# Patient Record
Sex: FEMALE | Race: WHITE | NOT HISPANIC OR LATINO | Employment: PART TIME | URBAN - METROPOLITAN AREA
[De-identification: names, ages, dates, MRNs, and addresses within clinical notes are randomized per-mention and may not be internally consistent; named-entity substitution may affect disease eponyms.]

---
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
## 2025-06-14 SDOH — HEALTH STABILITY: PHYSICAL HEALTH: ON AVERAGE, HOW MANY MINUTES DO YOU ENGAGE IN EXERCISE AT THIS LEVEL?: 20 MIN

## 2025-06-14 SDOH — HEALTH STABILITY: PHYSICAL HEALTH: ON AVERAGE, HOW MANY DAYS PER WEEK DO YOU ENGAGE IN MODERATE TO STRENUOUS EXERCISE (LIKE A BRISK WALK)?: 5 DAYS

## 2025-06-14 ASSESSMENT — SOCIAL DETERMINANTS OF HEALTH (SDOH): HOW OFTEN DO YOU GET TOGETHER WITH FRIENDS OR RELATIVES?: THREE TIMES A WEEK

## 2025-06-18 ASSESSMENT — PATIENT HEALTH QUESTIONNAIRE - PHQ9
SUM OF ALL RESPONSES TO PHQ QUESTIONS 1-9: 18
SUM OF ALL RESPONSES TO PHQ QUESTIONS 1-9: 18
10. IF YOU CHECKED OFF ANY PROBLEMS, HOW DIFFICULT HAVE THESE PROBLEMS MADE IT FOR YOU TO DO YOUR WORK, TAKE CARE OF THINGS AT HOME, OR GET ALONG WITH OTHER PEOPLE: SOMEWHAT DIFFICULT

## 2025-06-19 ENCOUNTER — RESULTS FOLLOW-UP (OUTPATIENT)
Dept: FAMILY MEDICINE | Facility: CLINIC | Age: 20
End: 2025-06-19

## 2025-06-19 ENCOUNTER — OFFICE VISIT (OUTPATIENT)
Dept: FAMILY MEDICINE | Facility: CLINIC | Age: 20
End: 2025-06-19
Payer: COMMERCIAL

## 2025-06-19 VITALS
HEART RATE: 75 BPM | TEMPERATURE: 97.9 F | WEIGHT: 102.44 LBS | BODY MASS INDEX: 18.15 KG/M2 | HEIGHT: 63 IN | OXYGEN SATURATION: 99 % | RESPIRATION RATE: 20 BRPM | DIASTOLIC BLOOD PRESSURE: 67 MMHG | SYSTOLIC BLOOD PRESSURE: 110 MMHG

## 2025-06-19 DIAGNOSIS — F32.1 MAJOR DEPRESSIVE DISORDER, SINGLE EPISODE, MODERATE (H): ICD-10-CM

## 2025-06-19 DIAGNOSIS — F41.1 GAD (GENERALIZED ANXIETY DISORDER): ICD-10-CM

## 2025-06-19 DIAGNOSIS — F32.81 PMDD (PREMENSTRUAL DYSPHORIC DISORDER): ICD-10-CM

## 2025-06-19 DIAGNOSIS — N92.0 MENORRHAGIA WITH REGULAR CYCLE: Primary | ICD-10-CM

## 2025-06-19 LAB
ERYTHROCYTE [DISTWIDTH] IN BLOOD BY AUTOMATED COUNT: 12.3 % (ref 10–15)
HCT VFR BLD AUTO: 39.8 % (ref 35–47)
HGB BLD-MCNC: 13.6 G/DL (ref 11.7–15.7)
MCH RBC QN AUTO: 28.4 PG (ref 26.5–33)
MCHC RBC AUTO-ENTMCNC: 34.2 G/DL (ref 31.5–36.5)
MCV RBC AUTO: 83 FL (ref 78–100)
PLATELET # BLD AUTO: 185 10E3/UL (ref 150–450)
RBC # BLD AUTO: 4.79 10E6/UL (ref 3.8–5.2)
WBC # BLD AUTO: 5.7 10E3/UL (ref 4–11)

## 2025-06-19 PROCEDURE — 83550 IRON BINDING TEST: CPT | Performed by: NURSE PRACTITIONER

## 2025-06-19 PROCEDURE — 3074F SYST BP LT 130 MM HG: CPT | Performed by: NURSE PRACTITIONER

## 2025-06-19 PROCEDURE — 36415 COLL VENOUS BLD VENIPUNCTURE: CPT | Performed by: NURSE PRACTITIONER

## 2025-06-19 PROCEDURE — 82728 ASSAY OF FERRITIN: CPT | Performed by: NURSE PRACTITIONER

## 2025-06-19 PROCEDURE — 85027 COMPLETE CBC AUTOMATED: CPT | Performed by: NURSE PRACTITIONER

## 2025-06-19 PROCEDURE — 99214 OFFICE O/P EST MOD 30 MIN: CPT | Performed by: NURSE PRACTITIONER

## 2025-06-19 PROCEDURE — 3078F DIAST BP <80 MM HG: CPT | Performed by: NURSE PRACTITIONER

## 2025-06-19 PROCEDURE — 1126F AMNT PAIN NOTED NONE PRSNT: CPT | Performed by: NURSE PRACTITIONER

## 2025-06-19 PROCEDURE — 83540 ASSAY OF IRON: CPT | Performed by: NURSE PRACTITIONER

## 2025-06-19 RX ORDER — SERTRALINE HYDROCHLORIDE 25 MG/1
25 TABLET, FILM COATED ORAL DAILY
Qty: 30 TABLET | Refills: 1 | Status: SHIPPED | OUTPATIENT
Start: 2025-06-19

## 2025-06-19 RX ORDER — NAPROXEN 500 MG/1
500 TABLET ORAL 2 TIMES DAILY PRN
Qty: 30 TABLET | Refills: 1 | Status: SHIPPED | OUTPATIENT
Start: 2025-06-19

## 2025-06-19 ASSESSMENT — PAIN SCALES - GENERAL: PAINLEVEL_OUTOF10: NO PAIN (0)

## 2025-06-19 ASSESSMENT — PATIENT HEALTH QUESTIONNAIRE - PHQ9: 5. POOR APPETITE OR OVEREATING: NEARLY EVERY DAY

## 2025-06-19 ASSESSMENT — ANXIETY QUESTIONNAIRES
3. WORRYING TOO MUCH ABOUT DIFFERENT THINGS: NEARLY EVERY DAY
IF YOU CHECKED OFF ANY PROBLEMS ON THIS QUESTIONNAIRE, HOW DIFFICULT HAVE THESE PROBLEMS MADE IT FOR YOU TO DO YOUR WORK, TAKE CARE OF THINGS AT HOME, OR GET ALONG WITH OTHER PEOPLE: VERY DIFFICULT
GAD7 TOTAL SCORE: 19
7. FEELING AFRAID AS IF SOMETHING AWFUL MIGHT HAPPEN: MORE THAN HALF THE DAYS
5. BEING SO RESTLESS THAT IT IS HARD TO SIT STILL: MORE THAN HALF THE DAYS
1. FEELING NERVOUS, ANXIOUS, OR ON EDGE: NEARLY EVERY DAY
GAD7 TOTAL SCORE: 19
6. BECOMING EASILY ANNOYED OR IRRITABLE: NEARLY EVERY DAY
2. NOT BEING ABLE TO STOP OR CONTROL WORRYING: NEARLY EVERY DAY

## 2025-06-19 NOTE — PATIENT INSTRUCTIONS
PLAN:   1.   Symptomatic therapy suggested: will start on zoloft half tablet a day for about 2 weeks and then can increase to whole  Will do naproxen as needed for menstrual cramps.    2.  Orders Placed This Encounter   Medications    naproxen (NAPROSYN) 500 MG tablet     Sig: Take 1 tablet (500 mg) by mouth 2 times daily as needed for moderate pain.     Dispense:  30 tablet     Refill:  1    sertraline (ZOLOFT) 25 MG tablet     Sig: Take 1 tablet (25 mg) by mouth daily.     Dispense:  30 tablet     Refill:  1     Orders Placed This Encounter   Procedures    REVIEW OF HEALTH MAINTENANCE PROTOCOL ORDERS    CBC with platelets    Ferritin    Iron & Iron Binding Capacity       3.  Follow up in 1 month.   Patient needs to follow up in if no improvement,or sooner if worsening of symptoms or other symptoms develop.        Patient Education   Learning About Stress  What is stress?     Stress is your body's response to a hard situation. Your body can have a physical, emotional, or mental response. Stress is a fact of life for most people, and it affects everyone differently. What causes stress for you may not be stressful for someone else.  A lot of things can cause stress. You may feel stress when you go on a job interview, take a test, or run a race. This kind of short-term stress is normal and even useful. It can help you if you need to work hard or react quickly. For example, stress can help you finish an important job on time.  Long-term stress is caused by ongoing stressful situations or events. Examples of long-term stress include long-term health problems, ongoing problems at work, or conflicts in your family. Long-term stress can harm your health.  How does stress affect your health?  When you are stressed, your body responds as though you are in danger. It makes hormones that speed up your heart, make you breathe faster, and give you a burst of energy. This is called the fight-or-flight stress response. If the  stress is over quickly, your body goes back to normal and no harm is done.  But if stress happens too often or lasts too long, it can have bad effects. Long-term stress can make you more likely to get sick, and it can make symptoms of some diseases worse. If you tense up when you are stressed, you may develop neck, shoulder, or low back pain. Stress is linked to high blood pressure and heart disease.  Stress also harms your emotional health. It can make you sharp, tense, or depressed. Your relationships may suffer, and you may not do well at work or school.  What can you do to manage stress?  You can try these things to help manage stress:   Do something active. Exercise or activity can help reduce stress. Walking is a great way to get started. Even everyday activities such as housecleaning or yard work can help.  Try yoga or cullen chi. These techniques combine exercise and meditation. You may need some training at first to learn them.  Do something you enjoy. For example, listen to music or go to a movie. Practice your hobby or do volunteer work.  Meditate. This can help you relax, because you are not worrying about what happened before or what may happen in the future.  Do guided imagery. Imagine yourself in any setting that helps you feel calm. You can use online videos, books, or a teacher to guide you.  Do breathing exercises. For example:  From a standing position, bend forward from the waist with your knees slightly bent. Let your arms dangle close to the floor.  Breathe in slowly and deeply as you return to a standing position. Roll up slowly and lift your head last.  Hold your breath for just a few seconds in the standing position.  Breathe out slowly and bend forward from the waist.  Let your feelings out. Talk, laugh, cry, and express anger when you need to. Talking with supportive friends or family, a counselor, or a tom leader about your feelings is a healthy way to relieve stress. Avoid discussing your  "feelings with people who make you feel worse.  Write. It may help to write about things that are bothering you. This helps you find out how much stress you feel and what is causing it. When you know this, you can find better ways to cope.  What can you do to prevent stress?  You might try some of these things to help prevent stress:  Manage your time. This helps you find time to do the things you want and need to do.  Get enough sleep. Your body recovers from the stresses of the day while you are sleeping.  Get support. Your family, friends, and community can make a difference in how you experience stress.  Limit your news feed. Avoid or limit time on social media or news that may make you feel stressed.  Do something active. Exercise or activity can help reduce stress. Walking is a great way to get started.  Where can you learn more?  Go to https://www.Entigo.bizsol/patiented  Enter N032 in the search box to learn more about \"Learning About Stress.\"  Current as of: October 24, 2024  Content Version: 14.5    0072-1098 [employer].   Care instructions adapted under license by your healthcare professional. If you have questions about a medical condition or this instruction, always ask your healthcare professional. [employer] disclaims any warranty or liability for your use of this information.    Learning About Depression Screening  What is depression screening?  Depression screening is a way to see if you have depression symptoms. It may be done by a doctor or counselor. It's often part of a routine checkup. That's because your mental health is just as important as your physical health.  Depression is a mental health condition that affects how you feel, think, and act. You may:  Have less energy.  Lose interest in your daily activities.  Feel sad and grouchy for a long time.  Depression is very common. It affects people of all ages.  Many things can lead to depression. Some people become " "depressed after they have a stroke or find out they have a major illness like cancer or heart disease. The death of a loved one or a breakup may lead to depression. It can run in families. Most experts believe that a combination of inherited genes and stressful life events can cause it.  What happens during screening?  You may be asked to fill out a form about your depression symptoms. You and the doctor will discuss your answers. The doctor may ask you more questions to learn more about how you think, act, and feel.  What happens after screening?  If you have symptoms of depression, your doctor will talk to you about your options.  Doctors usually treat depression with medicines or counseling. Often, combining the two works best. Many people don't get help because they think that they'll get over the depression on their own. But people with depression may not get better unless they get treatment.  The cause of depression is not well understood. There may be many factors involved. But if you have depression, it's not your fault.  A serious symptom of depression is thinking about death or suicide. If you or someone you care about talks about this or about feeling hopeless, get help right away.  It's important to know that depression can be treated. Medicine, counseling, and self-care may help.  Where can you learn more?  Go to https://www.You.i.net/patiented  Enter T185 in the search box to learn more about \"Learning About Depression Screening.\"  Current as of: July 31, 2024  Content Version: 14.5    9205-7734 [employer].   Care instructions adapted under license by your healthcare professional. If you have questions about a medical condition or this instruction, always ask your healthcare professional. [employer] disclaims any warranty or liability for your use of this information.       "

## 2025-06-19 NOTE — PROGRESS NOTES
[facility] BETTINA Segura CNP, Family Medicine  Jun 19, 2025      Assessment & Plan     Menorrhagia with regular cycle  Will follow up and/or notify patient of  results via My Chart to determine further need for followup   Will Start:  - naproxen (NAPROSYN) 500 MG tablet  Dispense: 30 tablet; Refill: 1  - CBC with platelets  - Ferritin  - Iron & Iron Binding Capacity    Major depressive disorder, single episode, moderate (H)  Initiate medication with Zoloft  25 mg q day  Reviewed concept of depression as function of biochemical imbalance of neurotransmitters/rationale for treatment.  Risks and benefits of medication(s) reviewed with patient.  Questions answered.  Counseling advised  Followup appointment in 1 month(s)  Patient instructed to call for significant side effects medications or problems  Patient advised immediate presentation to hospital for suicidal thought, etc.  - sertraline (ZOLOFT) 25 MG tablet  Dispense: 30 tablet; Refill: 1    JUAN CARLOS (generalized anxiety disorder)  Discussed the pathophysiology of anxiety episodes and the various symptoms seen associated with anxiety episodes.  Discussed possible triggers including fatigue, depression, stress, and chemicals such as alcohol, caffeine and certain drugs.  Discussed the treatment including an aerobic exercise program, adequate rest, and both rescue meds and maintenance meds.  Will Start:  - sertraline (ZOLOFT) 25 MG tablet  Dispense: 30 tablet; Refill: 1    PMDD (premenstrual dysphoric disorder)  Will Start:  - sertraline (ZOLOFT) 25 MG tablet  Dispense: 30 tablet; Refill: 1      Patient has been advised of split billing requirements and indicates understanding: Yes      Counseling  Appropriate preventive services were addressed with this patient via screening, questionnaire, or discussion as appropriate for fall prevention, nutrition, physical activity, Tobacco-use cessation, social engagement, weight loss and  cognition.  Checklist reviewing preventive services available has been given to the patient.  Reviewed patient's diet, addressing concerns and/or questions.   She is at risk for psychosocial distress and has been provided with information to reduce risk.   The patient's PHQ-9 score is consistent with moderate depression. She was provided with information regarding depression.         Jordy Talavera is a 19 year old, presenting for the following:  Physical        6/19/2025    11:23 AM   Additional Questions   Roomed by Anu VALLADARES   Accompanied by self          Healthy Habits:     Getting at least 3 servings of Calcium per day:  Yes    Bi-annual eye exam:  NO    Dental care twice a year:  Yes    Sleep apnea or symptoms of sleep apnea:  Daytime drowsiness    Diet:  Other    Frequency of exercise:  4-5 days/week    Duration of exercise:  15-30 minutes    Taking medications regularly:  Not Applicable    Barriers to taking medications:  None    Medication side effects:  None    Additional concerns today:  Yes (Menstrual cycle)  Menstrual cycle      Depression Screening Follow Up  Is seeing a counselor regularly for about a year   Is on the fence about being on medication   Has been struggling with anxiety since high school and depression for the last 3 years  Some family issues that contributed to this   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  Little interest or pleasure in doing things 2   2.  Feeling down, depressed, or hopeless 2   3.  Trouble falling or staying asleep, or sleeping too much 2   4.  Feeling tired or having little energy 3   5.  Poor appetite or overeating 1   6.  Feeling bad about yourself 3   7.  Trouble concentrating 3   8.   Moving slowly or restless 2   Q9: Thoughts of better off dead/self-harm past 2 weeks 0   PHQ-9 Total Score 18        Patient-reported         Follow Up Actions Taken  Referred patient back to current mental health provider.   Is already seeing a counselor         Vaginal Bleeding  Duration of complaint: has been irregular recently in the last year  First couple days has to call off work due to light headed and nausea and cramping   Had bad cramps before but definitely   Just takes mIdol    Description:   Duration of bleeding episodes: regularly except last March   Frequency between periods:  30  Describe bleeding/flow:   Clots: YES  Number of pads/hour: 1  Cramping: severe and moderate  Accompanying Signs & Symptoms:  Weakness: YES  Lightheadedness: YES  Hot flashes: no   Nosebleeds/Easy bruising: no  Vaginal Discharge: no  History:  Patient's last menstrual period was 05/15/2025 (approximate).  Previous normal periods: YES- but always had the cramping   Contraceptive use: NO  Possibility of Pregnancy: no  Any bleeding after intercourse: no  Age of first period (menarche): 14  Abnormal PAP Smears: no  Precipitating factors: no   Alleviating factors: Midol helps a little   Therapies Tried and outcome: Midol       Advance Care Planning    Discussed advance care planning with patient; informed AVS has link to [facility].        6/14/2025   General Health   How would you rate your overall physical health? (!) FAIR   Feel stress (tense, anxious, or unable to sleep) Very much   (!) STRESS CONCERN      6/14/2025   Nutrition   Three or more servings of calcium each day? Yes   Diet: I don't know   How many servings of fruit and vegetables per day? (!) 2-3   How many sweetened beverages each day? 0-1         6/14/2025   Exercise   Days per week of moderate/strenous exercise 5 days   Average minutes spent exercising at this level 20 min         6/14/2025   Social Factors   Frequency of gathering with friends or relatives  Three times a week   Worry food won't last until get money to buy more No   Food not last or not have enough money for food? No   Do you have housing? (Housing is defined as stable permanent housing and does not include staying outside in a car, in a tent, in an abandoned building, in an overnight shelter, or couch-surfing.) No   Are you worried about losing your housing? No   Lack of transportation? No   Unable to get utilities (heat,electricity)? No   Want help with housing or utility concern? No   (!) HOUSING CONCERN PRESENT      6/14/2025   Dental   Dentist two times every year? Yes       Today's PHQ-9 Score:       6/18/2025     5:09 PM   PHQ-9 SCORE   PHQ-9 Total Score MyChart 18 (Moderately severe depression)   PHQ-9 Total Score 18        Patient-reported         6/14/2025   Substance Use   Alcohol more than 3/day or more than 7/wk No   Do you use any other substances recreationally? No     Social History     Tobacco Use    Smoking status: Never     Passive exposure: Never    Smokeless tobacco: Never   Vaping Use    Vaping status: Never Used   Substance Use Topics    Alcohol use: Not Currently     Comment: Have tried briefly    Drug use: Never           6/14/2025   STI Screening   New sexual partner(s) since last STI/HIV test? No     History of abnormal Pap smear: No - under age 21, PAP not appropriate for age             6/14/2025   Contraception/Family Planning   Questions about contraception or family planning No        Reviewed and updated as needed this visit by Provider                    Past Medical History:   Diagnosis Date    Idiopathic scoliosis and kyphoscoliosis 02/18/2015    Formatting of this note might be different from the original.   Followed by orthopedics at [place].      Pectus excavatum 02/11/2013    Formatting of this note might be different from the original.   mild at 7 years, no progression at 10 years       No past surgical history on file.  Lab work is in process  Labs reviewed in  "EPIC  BP Readings from Last 3 Encounters:   06/19/25 110/67   10/01/24 90/70    Wt Readings from Last 3 Encounters:   06/19/25 46.5 kg (102 lb 7 oz) (5%, Z= -1.61)*   10/01/24 48.2 kg (106 lb 3 oz) (11%, Z= -1.25)*     * Growth percentiles are based on [facility] [place] (Girls, 2-20 Years) data.                  Patient Active Problem List   Diagnosis    Adolescent idiopathic scoliosis    Pectus excavatum     No past surgical history on file.    Social History     Tobacco Use    Smoking status: Never     Passive exposure: Never    Smokeless tobacco: Never   Substance Use Topics    Alcohol use: Not Currently     Comment: Have tried briefly     Family History   Problem Relation Age of Onset    Depression Mother     Anxiety Disorder Mother     Anxiety Disorder Sister          Current Outpatient Medications   Medication Sig Dispense Refill    albuterol (PROAIR HFA/PROVENTIL HFA/VENTOLIN HFA) 108 (90 Base) MCG/ACT inhaler Inhale 1-2 puffs into the lungs every 6 hours as needed for wheezing.       No Known Allergies    Constitutional, HEENT, cardiovascular, pulmonary, GI, , musculoskeletal, neuro, skin, endocrine and psych systems are negative, except as otherwise noted.       Objective    Exam  /67 (BP Location: Right arm, Patient Position: Sitting, Cuff Size: Adult Large)   Pulse 75   Temp 97.9  F (36.6  C) (Temporal)   Resp 20   Ht 1.6 m (5' 3\")   Wt 46.5 kg (102 lb 7 oz)   LMP 05/15/2025 (Approximate)   SpO2 99%   BMI 18.15 kg/m     Estimated body mass index is 18.15 kg/m  as calculated from the following:    Height as of this encounter: 1.6 m (5' 3\").    Weight as of this encounter: 46.5 kg (102 lb 7 oz).    Physical Exam  GENERAL: alert and no distress  EYES: Eyes grossly normal to inspection and conjunctivae and sclerae normal  HENT: ear canals and TM's normal, nose and mouth without ulcers or lesions  NECK: no adenopathy  RESP: lungs clear to auscultation - no rales, rhonchi or wheezes  CV: regular rates and " rhythm  ABDOMEN: soft, nontender  MS: no gross musculoskeletal defects noted, no edema  SKIN: no suspicious lesions or rashes  NEURO: Normal strength and tone, mentation intact and speech normal  PSYCH: mentation appears normal, affect normal/bright, and judgement and insight intact    Signed Electronically by: BETTINA Manuel CNP    Answers submitted by the patient for this visit:  Patient Health Questionnaire (Submitted on 6/18/2025)  If you checked off any problems, how difficult have these problems made it for you to do your work, take care of things at home, or get along with other people?: Somewhat difficult  PHQ9 TOTAL SCORE: 18

## 2025-06-20 LAB
FERRITIN SERPL-MCNC: 17 NG/ML (ref 6–175)
IRON BINDING CAPACITY (ROCHE): 282 UG/DL (ref 240–430)
IRON SATN MFR SERPL: 30 % (ref 15–46)
IRON SERPL-MCNC: 85 UG/DL (ref 37–145)

## 2025-07-19 ENCOUNTER — MYC REFILL (OUTPATIENT)
Dept: FAMILY MEDICINE | Facility: CLINIC | Age: 20
End: 2025-07-19
Payer: COMMERCIAL

## 2025-07-19 DIAGNOSIS — F32.1 MAJOR DEPRESSIVE DISORDER, SINGLE EPISODE, MODERATE (H): ICD-10-CM

## 2025-07-19 DIAGNOSIS — F41.1 GAD (GENERALIZED ANXIETY DISORDER): ICD-10-CM

## 2025-07-19 DIAGNOSIS — F32.81 PMDD (PREMENSTRUAL DYSPHORIC DISORDER): ICD-10-CM

## 2025-07-21 RX ORDER — SERTRALINE HYDROCHLORIDE 25 MG/1
25 TABLET, FILM COATED ORAL DAILY
Qty: 30 TABLET | Refills: 1 | Status: SHIPPED | OUTPATIENT
Start: 2025-07-21

## 2025-07-21 NOTE — TELEPHONE ENCOUNTER
Refill completed.   Please update PHQ 9 and JUAN CARLOS  Thanks        10/1/2024     7:18 AM 6/18/2025     5:09 PM 7/22/2025    10:00 AM   PHQ-9 SCORE   PHQ-9 Total Score MyChart 14 (Moderate depression) 18 (Moderately severe depression) 19 (Moderately severe depression)   PHQ-9 Total Score 14 18  19        Patient-reported           6/19/2025    12:43 PM 7/22/2025     9:59 AM   JUAN CARLOS-7 SCORE   Total Score  19 (severe anxiety)   Total Score 19 19        Patient-reported           10/1/2024     7:18 AM 6/18/2025     5:09 PM 7/22/2025    10:00 AM   [facility] Follow-up to PHQ   PHQ-9 9. Suicide Ideation past 2 weeks Not at all Not at all Not at all

## 2025-07-22 ASSESSMENT — ANXIETY QUESTIONNAIRES
7. FEELING AFRAID AS IF SOMETHING AWFUL MIGHT HAPPEN: MORE THAN HALF THE DAYS
3. WORRYING TOO MUCH ABOUT DIFFERENT THINGS: NEARLY EVERY DAY
7. FEELING AFRAID AS IF SOMETHING AWFUL MIGHT HAPPEN: MORE THAN HALF THE DAYS
8. IF YOU CHECKED OFF ANY PROBLEMS, HOW DIFFICULT HAVE THESE MADE IT FOR YOU TO DO YOUR WORK, TAKE CARE OF THINGS AT HOME, OR GET ALONG WITH OTHER PEOPLE?: VERY DIFFICULT
GAD7 TOTAL SCORE: 19
5. BEING SO RESTLESS THAT IT IS HARD TO SIT STILL: NEARLY EVERY DAY
GAD7 TOTAL SCORE: 19
6. BECOMING EASILY ANNOYED OR IRRITABLE: MORE THAN HALF THE DAYS
4. TROUBLE RELAXING: NEARLY EVERY DAY
1. FEELING NERVOUS, ANXIOUS, OR ON EDGE: NEARLY EVERY DAY
2. NOT BEING ABLE TO STOP OR CONTROL WORRYING: NEARLY EVERY DAY
IF YOU CHECKED OFF ANY PROBLEMS ON THIS QUESTIONNAIRE, HOW DIFFICULT HAVE THESE PROBLEMS MADE IT FOR YOU TO DO YOUR WORK, TAKE CARE OF THINGS AT HOME, OR GET ALONG WITH OTHER PEOPLE: VERY DIFFICULT
GAD7 TOTAL SCORE: 19

## 2025-07-22 ASSESSMENT — PATIENT HEALTH QUESTIONNAIRE - PHQ9
SUM OF ALL RESPONSES TO PHQ QUESTIONS 1-9: 19
10. IF YOU CHECKED OFF ANY PROBLEMS, HOW DIFFICULT HAVE THESE PROBLEMS MADE IT FOR YOU TO DO YOUR WORK, TAKE CARE OF THINGS AT HOME, OR GET ALONG WITH OTHER PEOPLE: VERY DIFFICULT
SUM OF ALL RESPONSES TO PHQ QUESTIONS 1-9: 19

## 2025-07-22 NOTE — TELEPHONE ENCOUNTER
This writer attempted to contact the patient on 07/22/25      Reason for call relay provider message below and left message.      If patient calls back:   Relay message from provider below, (read verbatim), document that pt called, assist with scheduling an appointment and triage if needed.     Phyllis Bobo RN on 7/22/2025 at 12:04 PM

## 2025-07-22 NOTE — TELEPHONE ENCOUNTER
Please call patient and see how she is doing as her scores are very elevated if she may need more urgent intervention. Otherwise needs a follow up appointment   Virtual is fine

## 2025-07-23 NOTE — TELEPHONE ENCOUNTER
Patient is scheduled:    Future Appointments 7/23/2025 - 1/19/2026        Date Visit Type Length Department Provider     7/28/2025  5:00 PM OFFICE VISIT 30 min BA [facility]/IM/Rosibel Walker APRN CNP    Location Instructions:     [facility] is located at [address] in [place]. This is just west of the [facility] exit off of [address] [address]. Free parking is available; from [facility], access the lot by turning onto [address] or [address].                    Yuli Simeon, RN, BSN, PHN

## 2025-07-28 ENCOUNTER — VIRTUAL VISIT (OUTPATIENT)
Dept: FAMILY MEDICINE | Facility: CLINIC | Age: 20
End: 2025-07-28
Payer: COMMERCIAL

## 2025-07-28 DIAGNOSIS — F32.81 PMDD (PREMENSTRUAL DYSPHORIC DISORDER): ICD-10-CM

## 2025-07-28 DIAGNOSIS — F41.1 GAD (GENERALIZED ANXIETY DISORDER): ICD-10-CM

## 2025-07-28 DIAGNOSIS — F32.1 MAJOR DEPRESSIVE DISORDER, SINGLE EPISODE, MODERATE (H): Primary | ICD-10-CM

## 2025-07-28 PROCEDURE — 98006 SYNCH AUDIO-VIDEO EST MOD 30: CPT | Performed by: NURSE PRACTITIONER

## 2025-07-28 NOTE — PATIENT INSTRUCTIONS
PLAN:   1.   Symptomatic therapy suggested: will increase the zoloft to 50 mg a day .  Continue counseling as planned  2.  Orders Placed This Encounter   Medications    sertraline (ZOLOFT) 50 MG tablet     Sig: Take 1 tablet (50 mg) by mouth daily.     Dispense:  30 tablet     Refill:  1     3.  Follow up in 1 month with a my chart message on how you are doing on the 50 mg of zoloft   Patient needs to follow up in if no improvement,or sooner if worsening of symptoms or other symptoms develop.

## 2025-07-28 NOTE — PROGRESS NOTES
Sadaf is a 19 year old who is being evaluated via a billable video visit.        Subjective   Sadaf is a 19 year old, presenting for the following health issues:  Anxiety and Depression    History of Present Illness       Mental Health Follow-up:  Patient presents to follow-up on Depression & Anxiety.Patient's depression since last visit has been:  Bad  The patient is having other symptoms associated with depression.  Patient's anxiety since last visit has been:  No change  The patient is having other symptoms associated with anxiety.  Any significant life events: No  Patient is feeling anxious or having panic attacks.  Patient has no concerns about alcohol or drug use.    She eats 2-3 servings of fruits and vegetables daily.She consumes 1 sweetened beverage(s) daily.She exercises with enough effort to increase her heart rate 10 to 19 minutes per day.  She exercises with enough effort to increase her heart rate 5 days per week.   She is taking medications regularly.        Depression and Anxiety   How are you doing with your depression since your last visit? Improved   How are you doing with your anxiety since your last visit?  Improved   Are you having other symptoms that might be associated with depression or anxiety? No  Have you had a significant life event? OTHER: school    Do you have any concerns with your use of alcohol or other drugs? 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 [employer]   Social History     Tobacco Use    Smoking status: Never     Passive exposure: Never    Smokeless tobacco: Never   Vaping Use    Vaping status: Never Used   Substance Use Topics    Alcohol use: Not Currently      Comment: Have tried briefly    Drug use: Never         10/1/2024     7:18 AM 6/18/2025     5:09 PM 7/22/2025    10:00 AM   PHQ   PHQ-9 Total Score 14 18  19    Q9: Thoughts of better off dead/self-harm past 2 weeks Not at all Not at all Not at all       Patient-reported         6/19/2025    12:43 PM 7/22/2025     9:59 AM   JUAN CARLOS-7 SCORE   Total Score  19 (severe anxiety)   Total Score 19 19        Patient-reported         7/22/2025    10:00 AM   Last PHQ-9   1.  Little interest or pleasure in doing things 2   2.  Feeling down, depressed, or hopeless 2   3.  Trouble falling or staying asleep, or sleeping too much 3   4.  Feeling tired or having little energy 3   5.  Poor appetite or overeating 2   6.  Feeling bad about yourself 2   7.  Trouble concentrating 3   8.  Moving slowly or restless 2   Q9: Thoughts of better off dead/self-harm past 2 weeks 0   PHQ-9 Total Score 19        Patient-reported         7/22/2025     9:59 AM   JUAN CARLOS-7    1. Feeling nervous, anxious, or on edge 3   2. Not being able to stop or control worrying 3   3. Worrying too much about different things 3   4. Trouble relaxing 3   5. Being so restless that it is hard to sit still 3   6. Becoming easily annoyed or irritable 2   7. Feeling afraid, as if something awful might happen 2   JUAN CARLOS-7 Total Score 19    If you checked any problems, how difficult have they made it for you to do your work, take care of things at home, or get along with other people? Very difficult       Patient-reported       Suicide Assessment Five-step Evaluation and Treatment (SAFE-T)        Labs reviewed in EPIC  BP Readings from Last 3 Encounters:   06/19/25 110/67   10/01/24 90/70    Wt Readings from Last 3 Encounters:   06/19/25 46.5 kg (102 lb 7 oz) (5%, Z= -1.61)*   10/01/24 48.2 kg (106 lb 3 oz) (11%, Z= -1.25)*     * Growth percentiles are based on CDC (Girls, 2-20 Years) data.                  Patient Active Problem List   Diagnosis    Adolescent idiopathic scoliosis     Pectus excavatum     No past surgical history on file.    Social History     Tobacco Use    Smoking status: Never     Passive exposure: Never    Smokeless tobacco: Never   Substance Use Topics    Alcohol use: Not Currently     Comment: Have tried briefly     Family History   Problem Relation Age of Onset    Depression Mother     Anxiety Disorder Mother     Anxiety Disorder Sister          Current Outpatient Medications   Medication Sig Dispense Refill    sertraline (ZOLOFT) 50 MG tablet Take 1 tablet (50 mg) by mouth daily. 30 tablet 1    naproxen (NAPROSYN) 500 MG tablet Take 1 tablet (500 mg) by mouth 2 times daily as needed for moderate pain. 30 tablet 1     No Known Allergies          Review of Systems  Constitutional, HEENT, cardiovascular, pulmonary, gi and gu systems are negative, except as otherwise noted.      Objective           Vitals:  No vitals were obtained today due to virtual visit.    Physical Exam   GENERAL: alert and no distress  EYES: Eyes grossly normal to inspection and conjunctivae and sclerae normal  NECK: No asymmetry, visible masses or scars  RESP: No audible wheeze, cough, or visible cyanosis.    MS: No gross musculoskeletal defects noted.  Normal range of motion.  No visible edema.  SKIN: Visible skin clear. No significant rash, abnormal pigmentation or lesions.  NEURO: Normal strength and tone and mentation intact  PSYCH: Alert, oriented, thought content appropriate, affect: normal, thought content exhibits logical connections, when questioned about suicide, the patient expresses no suicidal ideation, no homicidal ideation,mentation appears normal., affect and mood normal, oriented X 3  MENTAL STATUS EXAM:  Appearance/Behavior: No apparent distress, Casually groomed, and Dressed appropriately for weather  Speech: Normal  Mood/Affect: normal affect  Insight: Adequate    Diagnostic Test Results:   Diagnostic Test Results:  Labs reviewed in Epic        Assessment & Plan     Major depressive  disorder, single episode, moderate (H)  Change dose of medication to Zoloft  50 mg q day  Reviewed concept of depression as function of biochemical imbalance of neurotransmitters/rationale for treatment.  Risks and benefits of medication(s) reviewed with patient.  Questions answered.  Counseling advised  Followup appointment in 1 month(s)  Patient instructed to call for significant side effects medications or problems  Patient advised immediate presentation to hospital for suicidal thought, etc.  Increase   - sertraline (ZOLOFT) 50 MG tablet  Dispense: 30 tablet; Refill: 1    JUAN CARLOS (generalized anxiety disorder)  Discussed the pathophysiology of anxiety episodes and the various symptoms seen associated with anxiety episodes.  Discussed possible triggers including fatigue, depression, stress, and chemicals such as alcohol, caffeine and certain drugs.  Discussed the treatment including an aerobic exercise program, adequate rest, and both rescue meds and maintenance meds.  Increase   - sertraline (ZOLOFT) 50 MG tablet  Dispense: 30 tablet; Refill: 1    PMDD (premenstrual dysphoric disorder)  Increase   - sertraline (ZOLOFT) 50 MG tablet  Dispense: 30 tablet; Refill: 1      See Patient Instructions  Patient Instructions   PLAN:   1.   Symptomatic therapy suggested: will increase the zoloft to 50 mg a day .  Continue counseling as planned  2.  Orders Placed This Encounter   Medications    sertraline (ZOLOFT) 50 MG tablet     Sig: Take 1 tablet (50 mg) by mouth daily.     Dispense:  30 tablet     Refill:  1     3.  Follow up in 1 month with a my chart message on how you are doing on the 50 mg of zoloft   Patient needs to follow up in if no improvement,or sooner if worsening of symptoms or other symptoms develop.             Video-Visit Details    Type of service:  Video Visit   Originating Location (pt. Location): Home    Distant Location (provider location):  On-site  Platform used for Video Visit: Eliza Rivas  Electronically by: BETTINA Manuel CNP

## 2025-08-27 ENCOUNTER — MYC MEDICAL ADVICE (OUTPATIENT)
Dept: FAMILY MEDICINE | Facility: CLINIC | Age: 20
End: 2025-08-27
Payer: COMMERCIAL

## 2025-09-02 ENCOUNTER — MYC REFILL (OUTPATIENT)
Dept: FAMILY MEDICINE | Facility: CLINIC | Age: 20
End: 2025-09-02
Payer: COMMERCIAL

## 2025-09-02 DIAGNOSIS — N92.0 MENORRHAGIA WITH REGULAR CYCLE: ICD-10-CM

## 2025-09-03 RX ORDER — NAPROXEN 500 MG/1
500 TABLET ORAL 2 TIMES DAILY PRN
Qty: 30 TABLET | Refills: 1 | Status: SHIPPED | OUTPATIENT
Start: 2025-09-03